# Patient Record
(demographics unavailable — no encounter records)

---

## 2025-01-06 NOTE — PHYSICAL EXAM
[Alert] : alert [No Acute Distress] : no acute distress [Normocephalic] : normocephalic [Anterior Ava Closed] : anterior fontanelle closed [Red Reflex Bilateral] : red reflex bilateral [PERRL] : PERRL [Normally Placed Ears] : normally placed ears [Auricles Well Formed] : auricles well formed [Clear Tympanic membranes with present light reflex and bony landmarks] : clear tympanic membranes with present light reflex and bony landmarks [No Discharge] : no discharge [Nares Patent] : nares patent [Palate Intact] : palate intact [Uvula Midline] : uvula midline [Tooth Eruption] : tooth eruption  [Supple, full passive range of motion] : supple, full passive range of motion [No Palpable Masses] : no palpable masses [Symmetric Chest Rise] : symmetric chest rise [Clear to Auscultation Bilaterally] : clear to auscultation bilaterally [Regular Rate and Rhythm] : regular rate and rhythm [S1, S2 present] : S1, S2 present [No Murmurs] : no murmurs [+2 Femoral Pulses] : +2 femoral pulses [Soft] : soft [NonTender] : non tender [Non Distended] : non distended [Normoactive Bowel Sounds] : normoactive bowel sounds [No Hepatomegaly] : no hepatomegaly [No Splenomegaly] : no splenomegaly [Central Urethral Opening] : central urethral opening [Testicles Descended Bilaterally] : testicles descended bilaterally [Patent] : patent [Normally Placed] : normally placed [No Abnormal Lymph Nodes Palpated] : no abnormal lymph nodes palpated [No Clavicular Crepitus] : no clavicular crepitus [Symmetric Buttocks Creases] : symmetric buttocks creases [No Spinal Dimple] : no spinal dimple [NoTuft of Hair] : no tuft of hair [Cranial Nerves Grossly Intact] : cranial nerves grossly intact [No Rash or Lesions] : no rash or lesions

## 2025-01-06 NOTE — DEVELOPMENTAL MILESTONES
[Normal Development] : Normal Development [Help dress and undress self] : help dress and undress self [Points to pictures in book] : points to pictures in book [Turns and looks at adult if] : turns and looks at adult if something new happens [Begins to scoop with spoon] : begins to scoop with spoon [Uses 6 to 10 words other than] : uses 6 to 10 words other than names [Identifies at least 2 body parts] : identifies at least 2 body parts [Sits in small chair] : sits in small chair [Carries toy while walking] : carries toy while walking [Passed] : passed [Yes] : Completed. [FreeTextEntry1] : passed

## 2025-01-06 NOTE — DISCUSSION/SUMMARY
[] : The components of the vaccine(s) to be administered today are listed in the plan of care. The disease(s) for which the vaccine(s) are intended to prevent and the risks have been discussed with the caretaker.  The risks are also included in the appropriate vaccination information statements which have been provided to the patient's caregiver.  The caregiver has given consent to vaccinate. [FreeTextEntry1] : Continue whole cow's milk, limit to not more than 24 oz per day.  Offer other dairy products.  Continue table foods, 3 meals with 2-3 snacks per day. Incorporate flouridated water daily in a sippy cup. No bottles at this age.  Brush teeth twice a day with soft toothbrush. Recommend visit to dentist. When in car, keep child in rear-facing car seats until age 2, or until  the maximum height and weight for seat is reached. Put toddler to sleep in own bed or crib. Help toddler to maintain consistent daily routines and sleep schedule. Toilet training discussed. Recognize anxiety in new settings. Ensure home is safe. Be within arm's reach of toddler at all times. Use consistent, positive discipline. Read with toddler daily.

## 2025-01-06 NOTE — HISTORY OF PRESENT ILLNESS
[Mother] : mother [Cow's milk (Ounces per day ___)] : consumes [unfilled] oz of Cow's milk per day [In crib] : In crib [Brushing teeth] : Brushing teeth [Tap water] : Primary Fluoride Source: Tap water [No] : Not at  exposure [Car seat in back seat] : Car seat in back seat [Carbon Monoxide Detectors] : Carbon monoxide detectors [Smoke Detectors] : Smoke detectors [NO] : No [FreeTextEntry7] : had multiple viruses in November.  [FreeTextEntry9] : almost ready [de-identified] : fire extinguishers: yes

## 2025-03-28 NOTE — PHYSICAL EXAM
[NL] : normotonic [de-identified] : redness around right 1st phalanx nail bed and slight swelling, yellow appearing drainage around nail bed

## 2025-03-28 NOTE — DISCUSSION/SUMMARY
[FreeTextEntry1] : paronychia, early cellulitis warm soaks keflex 10 days mupirocin to ER if worsens follow up if symptoms worsen or persist more than 3 days

## 2025-03-28 NOTE — HISTORY OF PRESENT ILLNESS
[de-identified] : right thumb redness [FreeTextEntry6] : right thumb redness for few days  no fever  picks at thumb

## 2025-04-08 NOTE — HISTORY OF PRESENT ILLNESS
[de-identified] : fever [FreeTextEntry6] : fever 102.7 today  cranky yesterday  rhinorrhea  no cough  drinking  not eating much

## 2025-04-08 NOTE — DISCUSSION/SUMMARY
[FreeTextEntry1] : 1 yo with fever today, pharyngitis  rapid strep neg rapid flu neg  tylenol/motrin as needed  fluids follow up if symptoms worsen or persist more than 3 days right finger paronychia, improved from previous, warm soaks, follow if worsens

## 2025-04-10 NOTE — HISTORY OF PRESENT ILLNESS
[de-identified] : rash [FreeTextEntry6] : rash on palms and soles  resolved fever  eating and drinking

## 2025-04-10 NOTE — DISCUSSION/SUMMARY
[FreeTextEntry1] : 1 yo with resolved fever, pharyngits, rash appears as coxsackie motrin/tylenol if needed  prior rapid strep4/8/24 neg observe fluids follow up if symptoms worsen or persist more than 3 days

## 2025-04-10 NOTE — PHYSICAL EXAM
[Erythematous Oropharynx] : erythematous oropharynx [NL] : moves all extremities x4, warm, well perfused x4 [de-identified] : pink rash on hands, feet

## 2025-07-18 NOTE — HISTORY OF PRESENT ILLNESS
[Mother] : mother [Normal] : Normal [No] : No cigarette smoke exposure [Water heater temperature set at <120 degrees F] : Water heater temperature set at <120 degrees F [Car seat in back seat] : Car seat in back seat [Carbon Monoxide Detectors] : Carbon monoxide detectors [Smoke Detectors] : Smoke detectors [Supervised play near cars and streets] : Supervised play near cars and streets [NO] : No [FreeTextEntry1] : 30 mos old. Hears, many words and sentences.  Interacts well, good eye contact, plays laughs. Walks runs well. Eats well - good eater.  Sleeps well.   Has a cold x few days, runny nose. No fever. Little cough, improved. x 1 week, much better.

## 2025-07-18 NOTE — PHYSICAL EXAM

## 2025-07-18 NOTE — DISCUSSION/SUMMARY
[Normal Growth] : growth [Normal Development] : development [None] : No known medical problems [No Elimination Concerns] : elimination [No Feeding Concerns] : feeding [No Skin Concerns] : skin [Normal Sleep Pattern] : sleep [No Medications] : ~He/She~ is not on any medications [Parent/Guardian] : parent/guardian [] : The components of the vaccine(s) to be administered today are listed in the plan of care. The disease(s) for which the vaccine(s) are intended to prevent and the risks have been discussed with the caretaker.  The risks are also included in the appropriate vaccination information statements which have been provided to the patient's caregiver.  The caregiver has given consent to vaccinate. [FreeTextEntry1] : Wel child URI, looks good, has clear rhinorrhea, nl TMs and lungs. Prevnar given RT  HC 48.5 cm. Mother has large head. Past HC varied from 40-90%. Last 625.  On growth chart is 25% so OK